# Patient Record
Sex: FEMALE | Race: WHITE | URBAN - METROPOLITAN AREA
[De-identification: names, ages, dates, MRNs, and addresses within clinical notes are randomized per-mention and may not be internally consistent; named-entity substitution may affect disease eponyms.]

---

## 2018-12-18 ENCOUNTER — APPOINTMENT (RX ONLY)
Dept: URBAN - METROPOLITAN AREA CLINIC 22 | Facility: CLINIC | Age: 38
Setting detail: DERMATOLOGY
End: 2018-12-18

## 2018-12-18 DIAGNOSIS — L64.8 OTHER ANDROGENIC ALOPECIA: ICD-10-CM

## 2018-12-18 PROCEDURE — ? ORDER TESTS

## 2018-12-18 PROCEDURE — ? COUNSELING

## 2018-12-18 PROCEDURE — ? PRESCRIPTION

## 2018-12-18 PROCEDURE — 99202 OFFICE O/P NEW SF 15 MIN: CPT

## 2018-12-18 RX ORDER — KETOCONAZOLE 20.5 MG/ML
SHAMPOO, SUSPENSION TOPICAL
Qty: 1 | Refills: 3 | Status: ERX | COMMUNITY
Start: 2018-12-18

## 2018-12-18 RX ORDER — SPIRONOLACTONE 100 MG/1
TABLET, FILM COATED ORAL
Qty: 120 | Refills: 0 | Status: ERX

## 2018-12-18 RX ADMIN — KETOCONAZOLE 1: 20.5 SHAMPOO, SUSPENSION TOPICAL at 00:00

## 2018-12-18 ASSESSMENT — LOCATION SIMPLE DESCRIPTION DERM: LOCATION SIMPLE: SCALP

## 2018-12-18 ASSESSMENT — LOCATION DETAILED DESCRIPTION DERM: LOCATION DETAILED: RIGHT SUPERIOR PARIETAL SCALP

## 2018-12-18 ASSESSMENT — LOCATION ZONE DERM: LOCATION ZONE: SCALP

## 2018-12-18 NOTE — PROCEDURE: COUNSELING
Detail Level: Zone
Patient Specific Counseling (Will Not Stick From Patient To Patient): Continue with rogaine

## 2018-12-18 NOTE — HPI: HAIR LOSS (ALOPECIA AREATA)
How Severe Is It?: mild
Is This A New Presentation, Or A Follow-Up?: Hair Loss
Additional History: No history of skin cancer.  No family history of skin cancer.

## 2018-12-19 ENCOUNTER — HOSPITAL ENCOUNTER (OUTPATIENT)
Facility: MEDICAL CENTER | Age: 38
End: 2018-12-19
Attending: NURSE PRACTITIONER
Payer: COMMERCIAL

## 2018-12-19 PROCEDURE — 87070 CULTURE OTHR SPECIMN AEROBIC: CPT

## 2018-12-19 PROCEDURE — 87624 HPV HI-RISK TYP POOLED RSLT: CPT

## 2018-12-19 PROCEDURE — 87205 SMEAR GRAM STAIN: CPT

## 2018-12-19 PROCEDURE — 87077 CULTURE AEROBIC IDENTIFY: CPT

## 2018-12-19 PROCEDURE — 88175 CYTOPATH C/V AUTO FLUID REDO: CPT

## 2018-12-20 LAB
AMBIGUOUS DTTM AMBI4: NORMAL
AMBIGUOUS DTTM AMBI4: NORMAL
SIGNIFICANT IND 70042: NORMAL
SITE SITE: NORMAL
SOURCE SOURCE: NORMAL

## 2018-12-21 LAB
CYTOLOGY REG CYTOL: NORMAL
GRAM STN SPEC: NORMAL
HPV HR 12 DNA CVX QL NAA+PROBE: NEGATIVE
HPV16 DNA SPEC QL NAA+PROBE: NEGATIVE
HPV18 DNA SPEC QL NAA+PROBE: NEGATIVE
SIGNIFICANT IND 70042: NORMAL
SITE SITE: NORMAL
SOURCE SOURCE: NORMAL
SPECIMEN SOURCE: NORMAL

## 2018-12-23 LAB
BACTERIA GENITAL AEROBE CULT: NORMAL
GRAM STN SPEC: NORMAL
SIGNIFICANT IND 70042: NORMAL
SITE SITE: NORMAL
SOURCE SOURCE: NORMAL

## 2019-04-25 ENCOUNTER — APPOINTMENT (RX ONLY)
Dept: URBAN - METROPOLITAN AREA CLINIC 22 | Facility: CLINIC | Age: 39
Setting detail: DERMATOLOGY
End: 2019-04-25

## 2019-04-25 DIAGNOSIS — L64.8 OTHER ANDROGENIC ALOPECIA: ICD-10-CM | Status: IMPROVED

## 2019-04-25 PROCEDURE — 99213 OFFICE O/P EST LOW 20 MIN: CPT

## 2019-04-25 PROCEDURE — ? ORDER TESTS

## 2019-04-25 PROCEDURE — ? COUNSELING

## 2019-04-25 PROCEDURE — ? PRESCRIPTION

## 2019-04-25 RX ORDER — KETOCONAZOLE 20.5 MG/ML
SHAMPOO, SUSPENSION TOPICAL
Qty: 1 | Refills: 3 | Status: ERX

## 2019-04-25 RX ORDER — SPIRONOLACTONE 100 MG/1
TABLET, FILM COATED ORAL
Qty: 60 | Refills: 0 | Status: ERX

## 2019-04-25 ASSESSMENT — LOCATION DETAILED DESCRIPTION DERM: LOCATION DETAILED: RIGHT SUPERIOR PARIETAL SCALP

## 2019-04-25 ASSESSMENT — LOCATION ZONE DERM: LOCATION ZONE: SCALP

## 2019-04-25 ASSESSMENT — LOCATION SIMPLE DESCRIPTION DERM: LOCATION SIMPLE: SCALP

## 2019-11-20 ENCOUNTER — HOSPITAL ENCOUNTER (OUTPATIENT)
Dept: LAB | Facility: MEDICAL CENTER | Age: 39
End: 2019-11-20
Attending: DERMATOLOGY
Payer: COMMERCIAL

## 2019-11-20 LAB
CREAT SERPL-MCNC: 0.73 MG/DL (ref 0.5–1.4)
POTASSIUM SERPL-SCNC: 3.7 MMOL/L (ref 3.6–5.5)

## 2019-11-20 PROCEDURE — 36415 COLL VENOUS BLD VENIPUNCTURE: CPT

## 2019-11-20 PROCEDURE — 82565 ASSAY OF CREATININE: CPT

## 2019-11-20 PROCEDURE — 84132 ASSAY OF SERUM POTASSIUM: CPT

## 2019-11-25 ENCOUNTER — APPOINTMENT (RX ONLY)
Dept: URBAN - METROPOLITAN AREA CLINIC 22 | Facility: CLINIC | Age: 39
Setting detail: DERMATOLOGY
End: 2019-11-25

## 2019-11-25 DIAGNOSIS — L64.8 OTHER ANDROGENIC ALOPECIA: ICD-10-CM | Status: STABLE

## 2019-11-25 PROCEDURE — ? ADDITIONAL NOTES

## 2019-11-25 PROCEDURE — ? COUNSELING

## 2019-11-25 PROCEDURE — 99213 OFFICE O/P EST LOW 20 MIN: CPT

## 2019-11-25 ASSESSMENT — LOCATION ZONE DERM: LOCATION ZONE: SCALP

## 2019-11-25 ASSESSMENT — LOCATION DETAILED DESCRIPTION DERM: LOCATION DETAILED: RIGHT SUPERIOR PARIETAL SCALP

## 2019-11-25 ASSESSMENT — LOCATION SIMPLE DESCRIPTION DERM: LOCATION SIMPLE: SCALP

## 2019-11-25 NOTE — PROCEDURE: COUNSELING
Patient Specific Counseling (Will Not Stick From Patient To Patient): Continue with rogaine
Detail Level: Zone

## 2019-11-25 NOTE — PROCEDURE: ADDITIONAL NOTES
Detail Level: Detailed
Additional Notes: Discussed other treatment options such as pro and low level light therapy\\nShe is not interested in prp but will try LLL\\nRecommended hair max laser comb\\nContinue with spironolactone and rogAine daily

## 2019-12-30 ENCOUNTER — RX ONLY (OUTPATIENT)
Age: 39
Setting detail: RX ONLY
End: 2019-12-30

## 2020-06-23 ENCOUNTER — RX ONLY (OUTPATIENT)
Age: 40
Setting detail: RX ONLY
End: 2020-06-23

## 2020-06-23 RX ORDER — SPIRONOLACTONE 100 MG/1
1 TABLET, FILM COATED ORAL BID
Qty: 60 | Refills: 4 | Status: ERX

## 2020-09-09 ENCOUNTER — APPOINTMENT (RX ONLY)
Dept: URBAN - METROPOLITAN AREA CLINIC 22 | Facility: CLINIC | Age: 40
Setting detail: DERMATOLOGY
End: 2020-09-09

## 2020-09-09 DIAGNOSIS — L64.8 OTHER ANDROGENIC ALOPECIA: ICD-10-CM | Status: INADEQUATELY CONTROLLED

## 2020-09-09 DIAGNOSIS — L28.0 LICHEN SIMPLEX CHRONICUS: ICD-10-CM

## 2020-09-09 PROCEDURE — ? COUNSELING

## 2020-09-09 PROCEDURE — ? ADDITIONAL NOTES

## 2020-09-09 PROCEDURE — 99214 OFFICE O/P EST MOD 30 MIN: CPT

## 2020-09-09 PROCEDURE — ? PRESCRIPTION

## 2020-09-09 RX ORDER — FLUOCINONIDE 0.5 MG/G
OINTMENT TOPICAL
Qty: 1 | Refills: 0 | Status: ERX | COMMUNITY
Start: 2020-09-09

## 2020-09-09 RX ORDER — SPIRONOLACTONE 100 MG/1
TABLET, FILM COATED ORAL BID
Qty: 180 | Refills: 1 | Status: ERX

## 2020-09-09 RX ADMIN — FLUOCINONIDE: 0.5 OINTMENT TOPICAL at 00:00

## 2020-09-09 ASSESSMENT — LOCATION DETAILED DESCRIPTION DERM
LOCATION DETAILED: RIGHT SUPERIOR PARIETAL SCALP
LOCATION DETAILED: RIGHT DISTAL PRETIBIAL REGION

## 2020-09-09 ASSESSMENT — LOCATION SIMPLE DESCRIPTION DERM
LOCATION SIMPLE: SCALP
LOCATION SIMPLE: RIGHT PRETIBIAL REGION

## 2020-09-09 ASSESSMENT — LOCATION ZONE DERM
LOCATION ZONE: LEG
LOCATION ZONE: SCALP

## 2020-09-09 NOTE — PROCEDURE: COUNSELING
Patient Specific Counseling (Will Not Stick From Patient To Patient): Continue with rogAine and spironolactone \\nTolerating treatment\\n May have had telogen effluvium from stress related to COVID in spring 2020\\nConsider low level light therapy device\\nNot interested in prp
Detail Level: Zone
Detail Level: Detailed

## 2020-09-09 NOTE — HPI: SKIN LESION
Is This A New Presentation, Or A Follow-Up?: Skin Lesion
What Type Of Note Output Would You Prefer (Optional)?: Standard Output
How Severe Is Your Skin Lesion?: moderate
Has Your Skin Lesion Been Treated?: not been treated
Additional History: Patient thought it was wart and treated with otc wart treat.

## 2020-09-09 NOTE — HPI: HAIR LOSS (ALOPECIA AREATA)
How Severe Is It?: moderate
Is This A New Presentation, Or A Follow-Up?: Follow Up Alopecia Areata
Additional History: Currently on spironolactone 100mg bid daily and rogaine daily

## 2021-02-23 ENCOUNTER — HOSPITAL ENCOUNTER (OUTPATIENT)
Facility: MEDICAL CENTER | Age: 41
End: 2021-02-23
Attending: NURSE PRACTITIONER
Payer: COMMERCIAL

## 2021-02-23 LAB
APPEARANCE UR: CLEAR
BILIRUB UR QL STRIP.AUTO: NEGATIVE
COLOR UR: YELLOW
GLUCOSE UR STRIP.AUTO-MCNC: NEGATIVE MG/DL
KETONES UR STRIP.AUTO-MCNC: NEGATIVE MG/DL
LEUKOCYTE ESTERASE UR QL STRIP.AUTO: NEGATIVE
MICRO URNS: NORMAL
NITRITE UR QL STRIP.AUTO: NEGATIVE
PH UR STRIP.AUTO: 7 [PH] (ref 5–8)
PROT UR QL STRIP: NEGATIVE MG/DL
RBC UR QL AUTO: NEGATIVE
SP GR UR STRIP.AUTO: 1.01
UROBILINOGEN UR STRIP.AUTO-MCNC: 0.2 MG/DL

## 2021-02-23 PROCEDURE — 87624 HPV HI-RISK TYP POOLED RSLT: CPT

## 2021-02-23 PROCEDURE — 81003 URINALYSIS AUTO W/O SCOPE: CPT

## 2021-02-23 PROCEDURE — 88175 CYTOPATH C/V AUTO FLUID REDO: CPT

## 2021-02-24 LAB
CYTOLOGY REG CYTOL: NORMAL
HPV HR 12 DNA CVX QL NAA+PROBE: NEGATIVE
HPV16 DNA SPEC QL NAA+PROBE: NEGATIVE
HPV18 DNA SPEC QL NAA+PROBE: NEGATIVE
SPECIMEN SOURCE: NORMAL

## 2021-05-06 ENCOUNTER — RX ONLY (OUTPATIENT)
Age: 41
Setting detail: RX ONLY
End: 2021-05-06

## 2021-05-06 ENCOUNTER — APPOINTMENT (RX ONLY)
Dept: URBAN - METROPOLITAN AREA CLINIC 22 | Facility: CLINIC | Age: 41
Setting detail: DERMATOLOGY
End: 2021-05-06

## 2021-05-06 DIAGNOSIS — L72.0 EPIDERMAL CYST: ICD-10-CM

## 2021-05-06 DIAGNOSIS — L64.8 OTHER ANDROGENIC ALOPECIA: ICD-10-CM | Status: WELL CONTROLLED

## 2021-05-06 PROBLEM — D48.5 NEOPLASM OF UNCERTAIN BEHAVIOR OF SKIN: Status: ACTIVE | Noted: 2021-05-06

## 2021-05-06 PROCEDURE — ? TREATMENT REGIMEN

## 2021-05-06 PROCEDURE — ? COUNSELING

## 2021-05-06 PROCEDURE — ? ADDITIONAL NOTES

## 2021-05-06 PROCEDURE — ? PRESCRIPTION

## 2021-05-06 PROCEDURE — 99213 OFFICE O/P EST LOW 20 MIN: CPT

## 2021-05-06 RX ORDER — SPIRONOLACTONE 100 MG/1
TABLET, FILM COATED ORAL BID
Qty: 180 | Refills: 1 | Status: ERX

## 2021-05-06 ASSESSMENT — LOCATION SIMPLE DESCRIPTION DERM
LOCATION SIMPLE: SCALP
LOCATION SIMPLE: RIGHT POSTERIOR AXILLA

## 2021-05-06 ASSESSMENT — LOCATION ZONE DERM
LOCATION ZONE: SCALP
LOCATION ZONE: AXILLAE

## 2021-05-06 ASSESSMENT — LOCATION DETAILED DESCRIPTION DERM
LOCATION DETAILED: RIGHT POSTERIOR AXILLA
LOCATION DETAILED: RIGHT SUPERIOR PARIETAL SCALP

## 2021-05-06 NOTE — PROCEDURE: ADDITIONAL NOTES
Detail Level: Detailed
Additional Notes: Discussed other treatment options such as pro and low level light therapy\\nShe is not interested in prp but will try LLL\\nRecommended hair max laser comb

## 2021-09-08 ENCOUNTER — TELEPHONE (OUTPATIENT)
Dept: SCHEDULING | Facility: IMAGING CENTER | Age: 41
End: 2021-09-08

## 2021-09-17 ENCOUNTER — OFFICE VISIT (OUTPATIENT)
Dept: MEDICAL GROUP | Facility: LAB | Age: 41
End: 2021-09-17
Payer: COMMERCIAL

## 2021-09-17 ENCOUNTER — TELEPHONE (OUTPATIENT)
Dept: MEDICAL GROUP | Facility: LAB | Age: 41
End: 2021-09-17

## 2021-09-17 VITALS
TEMPERATURE: 99.1 F | SYSTOLIC BLOOD PRESSURE: 132 MMHG | DIASTOLIC BLOOD PRESSURE: 96 MMHG | HEART RATE: 91 BPM | HEIGHT: 67 IN | OXYGEN SATURATION: 95 % | BODY MASS INDEX: 21.49 KG/M2 | RESPIRATION RATE: 18 BRPM | WEIGHT: 136.91 LBS

## 2021-09-17 DIAGNOSIS — J01.00 ACUTE NON-RECURRENT MAXILLARY SINUSITIS: ICD-10-CM

## 2021-09-17 DIAGNOSIS — H00.015 HORDEOLUM EXTERNUM OF LEFT LOWER EYELID: ICD-10-CM

## 2021-09-17 DIAGNOSIS — Z13.220 LIPID SCREENING: ICD-10-CM

## 2021-09-17 DIAGNOSIS — L65.9 THINNING HAIR: ICD-10-CM

## 2021-09-17 DIAGNOSIS — Z76.89 ENCOUNTER TO ESTABLISH CARE: ICD-10-CM

## 2021-09-17 DIAGNOSIS — R20.2 LEFT HAND PARESTHESIA: ICD-10-CM

## 2021-09-17 PROBLEM — H00.019 STYE: Status: ACTIVE | Noted: 2021-09-17

## 2021-09-17 PROCEDURE — 99204 OFFICE O/P NEW MOD 45 MIN: CPT | Performed by: PHYSICIAN ASSISTANT

## 2021-09-17 RX ORDER — AMOXICILLIN 500 MG/1
500 CAPSULE ORAL 2 TIMES DAILY
Qty: 5 CAPSULE | Refills: 0 | Status: SHIPPED | OUTPATIENT
Start: 2021-09-17 | End: 2021-09-17 | Stop reason: CLARIF

## 2021-09-17 RX ORDER — SPIRONOLACTONE 100 MG/1
TABLET, FILM COATED ORAL
COMMUNITY
Start: 2021-09-07

## 2021-09-17 RX ORDER — AMOXICILLIN 500 MG/1
500 CAPSULE ORAL 2 TIMES DAILY
Qty: 10 CAPSULE | Refills: 0 | Status: SHIPPED | OUTPATIENT
Start: 2021-09-17 | End: 2021-09-22

## 2021-09-17 ASSESSMENT — PATIENT HEALTH QUESTIONNAIRE - PHQ9: CLINICAL INTERPRETATION OF PHQ2 SCORE: 0

## 2021-09-17 NOTE — ASSESSMENT & PLAN NOTE
Chronic condition stable  Condition appears attributable to poor ergonomics and some degree of carpal or ulnar tunnel involvement.      Advised patient to try trial of OTC NSAIDs and brace for left hand.  Also discussed appropriate ergonomics for workstation.  B12/folate deficiency unlikely given patient does not have dietary restrictions. Patient will follow up in 1 to 2 months to reassess.

## 2021-09-17 NOTE — ASSESSMENT & PLAN NOTE
Acute condition stable  Sinus symptoms for 4 weeks.  Covid test pending  Start antibiotics, amoxicillin 500 mg twice daily x5 days, increase hydration, OTC decongestants, OTC antihistamines  Discussed red flag symptoms and indications for follow-up

## 2021-09-17 NOTE — ASSESSMENT & PLAN NOTE
New condition stable  Advised patient to use warm compresses over eye region and gentle massage as tolerated  Discussed red flag symptoms and follow-up indications

## 2021-09-17 NOTE — TELEPHONE ENCOUNTER
Updated rx for amoxicillin 500mg BID x 5 days #10 sent to pharmacy. Contacted to notify her to  corrected rx.     1. Caller Name: Kimberly Patrick                          Call Back Number: [phone] (home)         How would the patient prefer to be contacted with a response:     Pt picked up RX and received 5 tabs instead of 10. Please clarify if this RX is for twice a day 10 tabs.

## 2021-09-17 NOTE — PROGRESS NOTES
Subjective:     CC:  Establish care  New pt, new to me    HISTORY OF THE PRESENT ILLNESS: Patient is a 41 y.o. female. This pleasant patient is here today to establish care and discuss URI symptoms, left eye stye, left hand numbness/tingling. His/her prior PCP was none.    Patient Active Problem List   Diagnosis   • Left hand paresthesia   • Thinning hair   • Acute non-recurrent maxillary sinusitis   • Stye     1. Wellness examination  Patient is here today to establish care.  No previous PCP.  She has a few acute complaints that she would also like to discuss today    2. Lipid screening  Patient states she has never had a lipid screening before.  Will order today.  Instructed patient to fast    3. Acute non-recurrent maxillary sinusitis   Concerns of URI. Ear pressure and popping x 4 weeks. Tongue swelling sensation. Sinus pressure. Denies signficant mucus output. Covid test pending [place], done at [facility]  Denies fever, chills  Denies SOB, + cough when laying down x 1 week  Completed 1st dose of covid vaccine 3 weeks ago.     4. Left hand paresthesia  Patient reports persistent left hand numbness/tingling, this is concentrated over the fourth and fifth digits of her left hand it occasionally extends to the elbow.  Denies previous hand wrist or elbow injuries. Denies previous surgeries. Denies trigger finger. Patient is right-hand dominant.  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  Patient is right-hand dominant.  Patient denies any dietary restrictions, she does eat animal products    5. Thinning hair  Patient follows with a dermatologist Dr. Mira Gifford for this.  She takes 100 mg spironolactone daily.  Condition is stable at this time    6. Hordeolum externum of left lower eyelid   L eye stye, describes as lump under the lower eyelid x 2 months. Denies changes in vision. Pt reports 1 remote episode of draiage from the lump under her eye. Denies orbit  "pain    Current Outpatient Medications Ordered in Epic   Medication Sig Dispense Refill   • spironolactone (ALDACTONE) 100 MG Tab      • amoxicillin (AMOXIL) 500 MG Cap Take 1 Capsule by mouth 2 times a day. 5 Capsule 0     No current Epic-ordered facility-administered medications on file.       Health Maintenance: Completed    ROS:   Gen: no fevers/chills, no changes in weight  Eyes: no changes in vision  ENT: no sore throat, no hearing loss  Pulm: no sob, + occasional cough  CV: no chest pain, no palpitations  GI: no nausea/vomiting, no diarrhea  : no dysuria  MSk: no myalgias  Skin: no rash  Neuro: no headaches,+ numbness/tingling L hand  Heme/Lymph: no easy bruising      Objective:       Exam: /96 (BP Location: Left arm, Patient Position: Sitting, BP Cuff Size: Adult)   Pulse 91   Temp 37.3 °C (99.1 °F) (Temporal)   Resp 18   Ht 1.702 m (5' 7\")   Wt 62.1 kg (136 lb 14.5 oz)   SpO2 95%  Body mass index is 21.44 kg/m².    General: Normal appearing. No distress.  HEENT: Normocephalic. Eyes conjunctiva clear lids without ptosis, pupils equal and reactive to light accommodation, ears normal shape and contour, canals are clear bilaterally, tympanic membranes are benign, nasal mucosa benign, oropharynx is with slight erythema, no edema or no exudates. Slightly TTP over maxillary sinus region  Neck: Supple without JVD or bruit. Thyroid is not enlarged.  Pulmonary: Clear to ausculation.  Normal effort. No rales, ronchi, or wheezing.  Cardiovascular: Regular rate and rhythm without murmur. Carotid and radial pulses are intact and equal bilaterally.  Abdomen: Soft, nontender, nondistended. Normal bowel sounds. Liver and spleen are not palpable  Neurologic: Grossly nonfocal  Lymph: No cervical or supraclavicular lymph nodes are palpable  Skin: Warm and dry.  No obvious lesions.  Wrist/Hand: No deformity, swelling or bruising noted. Tenderness to palpation neg. No tenderness at snuffbox. Range of motion intact. " Strength and sensation intact.  Good  strength. 2+ radial pulse. Finkelstein's test: neg.  Musculoskeletal: Normal gait. No extremity cyanosis, clubbing, or edema.  Psych: Normal mood and affect. Alert and oriented x3. Judgment and insight is normal.        Assessment & Plan:   41 y.o. female with the following -    1. Encounter to Establish Care  Cervical CA (21-65): Due February 23, 2024  - Abnormal pap/HPV: none    Breast CA: mammo (required 50-75yo) or starting 40 (ACOG, ACR), 45 (ACS), 50 (USPTF): Patient would prefer to defer mammogram to age 45    - Dyslipidemia (30-45): Ordered today    - Diabetes (HTN, HLD, BMI >25): n/a    - HIV/STI: Patient elected to defer, in a monogamous relationship with her     - Lung CA (50-81y/o w/20py smoking hx & currently smoke or quit w/i past 15 yrs): n/a    - Depression screening (PHQ-2 and/or PHQ-9): neg    -Patient due for second dose of Covid vaccine.  This was delayed because of recent URI symptoms.  Encourage patient to complete vaccine series upon negative Covid test and improvement of symptoms.    2. Lipid screening  Labs ordered  - CBC WITH DIFFERENTIAL; Future  - Lipid Profile; Future  - Comp Metabolic Panel; Future    3. Acute non-recurrent maxillary sinusitis  Acute condition stable  Sinus symptoms for 4 weeks.  Covid test pending  Start antibiotics, amoxicillin 500 mg twice daily x5 days, increase hydration, OTC decongestants, OTC antihistamines  Discussed red flag symptoms and indications for follow-up  - amoxicillin (AMOXIL) 500 MG Cap; Take 1 Capsule by mouth 2 times a day.  Dispense: 5 Capsule; Refill: 0    4. Left hand paresthesia  Chronic condition stable  Condition appears attributable to poor ergonomics and some degree of carpal or ulnar tunnel involvement.      Advised patient to try trial of OTC NSAIDs and brace for left hand.  Also discussed appropriate ergonomics for workstation.  B12/folate deficiency unlikely given patient does not have  dietary restrictions. Patient will follow up in 1 to 2 months to reassess.    5. Thinning hair  Continue follow-up with Derm. Continue medications as directed    6. Hordeolum externum of left lower eyelid  New condition stable  Advised patient to use warm compresses over eye region and gentle massage as tolerated  Discussed red flag symptoms and follow-up indications      Return 1-2.  Months to review labs     Please note that this dictation was created using voice recognition software. I have made every reasonable attempt to correct obvious errors, but I expect that there are errors of grammar and possibly content that I did not discover before finalizing the note.

## 2021-09-27 DIAGNOSIS — H00.015 HORDEOLUM EXTERNUM OF LEFT LOWER EYELID: ICD-10-CM

## 2021-09-27 RX ORDER — ERYTHROMYCIN 5 MG/G
1 OINTMENT OPHTHALMIC
Qty: 3.5 G | Refills: 0 | Status: SHIPPED | OUTPATIENT
Start: 2021-09-27 | End: 2021-10-04

## 2021-10-18 ENCOUNTER — OFFICE VISIT (OUTPATIENT)
Dept: MEDICAL GROUP | Facility: LAB | Age: 41
End: 2021-10-18
Payer: COMMERCIAL

## 2021-10-18 VITALS
BODY MASS INDEX: 21.87 KG/M2 | HEART RATE: 78 BPM | TEMPERATURE: 98.1 F | HEIGHT: 67 IN | WEIGHT: 139.33 LBS | DIASTOLIC BLOOD PRESSURE: 68 MMHG | OXYGEN SATURATION: 98 % | RESPIRATION RATE: 16 BRPM | SYSTOLIC BLOOD PRESSURE: 118 MMHG

## 2021-10-18 DIAGNOSIS — R20.2 LEFT HAND PARESTHESIA: ICD-10-CM

## 2021-10-18 DIAGNOSIS — H00.015 HORDEOLUM EXTERNUM OF LEFT LOWER EYELID: ICD-10-CM

## 2021-10-18 PROCEDURE — 99213 OFFICE O/P EST LOW 20 MIN: CPT | Performed by: PHYSICIAN ASSISTANT

## 2021-10-18 NOTE — PROGRESS NOTES
"Subjective:     CC: Follow-up, discuss labs    HPI:   Patient is here today for 1 month follow-up.  She was not able to get her fasting blood work done, but states that she will get it done later this week.    Since last visit patient has gotten second Covid vaccine.    Hordeolum externum of left lower eyelid  Patient states that the hordeolum has gone down in size but is still noticeable if she palpates the area.  She is happy with this progress and would like to continue watchful waiting. Denies changes in vision. Denies orbit pain.  Denies drainage from the area    Left hand paresthesia  Patient previously had left hand numbness/tingling over the fourth and fifth digits.  We had previously discussed trying a hand brace, anti-inflammatories and reassessing her workspace ergonomics.  With these changes patient states that her symptoms have improved significantly.  She reports very infrequent episodes of tingling.  She would like to continue with these current changes and continue to monitor.    ROS:  Gen: no fevers/chills, no changes in weight  Eyes: no changes in vision  ENT: no sore throat  Pulm: no sob, no cough  CV: no chest pain, no palpitations  GI: no nausea/vomiting, no diarrhea  : no dysuria    Current Outpatient Medications Ordered in Epic   Medication Sig Dispense Refill   • spironolactone (ALDACTONE) 100 MG Tab        No current Epic-ordered facility-administered medications on file.       Health Maintenance: Completed    Objective:     Exam:  /68   Pulse 78   Temp 36.7 °C (98.1 °F)   Resp 16   Ht 1.702 m (5' 7\")   Wt 63.2 kg (139 lb 5.3 oz)   SpO2 98%   BMI 21.82 kg/m²  Body mass index is 21.82 kg/m².    Gen: Alert and oriented, No apparent distress.  Neck: Neck is supple without lymphadenopathy.  Lungs: Normal effort, CTA bilaterally, no wheezes, rhonchi, or rales  CV: Regular rate and rhythm. No murmurs, rubs, or gallops.  Ext: No clubbing, cyanosis, edema.    Assessment & Plan:     41 " y.o. female with the following -       1. Hordeolum externum of left lower eyelid  Acute condition, improving  Continue use of warm compresses and gentle massage as tolerated  Discussed red flag symptoms and follow-up indications    2. Left hand paresthesia  Chronic condition, improving  Significant improvement with use of brace and NSAIDs and modifying workspace ergonomics  We will continue to monitor    Lipid screening  Strongly encouraged patient to get fasting lab work done    I spent a total of 16 minutes with record review, exam, communication with the patient, communication with other providers, and documentation of this encounter.      Return in about 1 year (around 10/18/2022).    Please note that this dictation was created using voice recognition software. I have made every reasonable attempt to correct obvious errors, but there may be errors of grammar and possibly content that I did not discover before finalizing the note.

## 2022-10-18 ENCOUNTER — APPOINTMENT (OUTPATIENT)
Dept: MEDICAL GROUP | Facility: LAB | Age: 42
End: 2022-10-18
Payer: COMMERCIAL

## 2023-05-26 ENCOUNTER — TELEPHONE (OUTPATIENT)
Dept: HEALTH INFORMATION MANAGEMENT | Facility: OTHER | Age: 43
End: 2023-05-26